# Patient Record
Sex: MALE | Race: BLACK OR AFRICAN AMERICAN | Employment: FULL TIME | URBAN - METROPOLITAN AREA
[De-identification: names, ages, dates, MRNs, and addresses within clinical notes are randomized per-mention and may not be internally consistent; named-entity substitution may affect disease eponyms.]

---

## 2024-03-26 ENCOUNTER — HOSPITAL ENCOUNTER (EMERGENCY)
Facility: CLINIC | Age: 24
Discharge: HOME | End: 2024-03-26
Attending: STUDENT IN AN ORGANIZED HEALTH CARE EDUCATION/TRAINING PROGRAM | Admitting: STUDENT IN AN ORGANIZED HEALTH CARE EDUCATION/TRAINING PROGRAM
Payer: COMMERCIAL

## 2024-03-26 VITALS
BODY MASS INDEX: 20.04 KG/M2 | OXYGEN SATURATION: 99 % | HEART RATE: 86 BPM | DIASTOLIC BLOOD PRESSURE: 81 MMHG | RESPIRATION RATE: 18 BRPM | TEMPERATURE: 97.3 F | WEIGHT: 140 LBS | SYSTOLIC BLOOD PRESSURE: 134 MMHG | HEIGHT: 70 IN

## 2024-03-26 DIAGNOSIS — R30.0 DYSURIA: ICD-10-CM

## 2024-03-26 DIAGNOSIS — R36.9 PENILE DISCHARGE: ICD-10-CM

## 2024-03-26 DIAGNOSIS — L30.1 DYSHIDROSIS (POMPHOLYX): ICD-10-CM

## 2024-03-26 LAB
ALBUMIN UR-MCNC: NEGATIVE MG/DL
APPEARANCE UR: CLEAR
BILIRUB UR QL STRIP: NEGATIVE
COLOR UR AUTO: ABNORMAL
GLUCOSE UR STRIP-MCNC: NEGATIVE MG/DL
HGB UR QL STRIP: NEGATIVE
KETONES UR STRIP-MCNC: NEGATIVE MG/DL
LEUKOCYTE ESTERASE UR QL STRIP: NEGATIVE
MUCOUS THREADS #/AREA URNS LPF: PRESENT /LPF
NITRATE UR QL: NEGATIVE
PH UR STRIP: 5.5 [PH] (ref 5–7)
RBC URINE: <1 /HPF
SP GR UR STRIP: 1.02 (ref 1–1.03)
UROBILINOGEN UR STRIP-MCNC: NORMAL MG/DL
WBC URINE: <1 /HPF

## 2024-03-26 PROCEDURE — 96372 THER/PROPH/DIAG INJ SC/IM: CPT | Performed by: STUDENT IN AN ORGANIZED HEALTH CARE EDUCATION/TRAINING PROGRAM

## 2024-03-26 PROCEDURE — 99284 EMERGENCY DEPT VISIT MOD MDM: CPT

## 2024-03-26 PROCEDURE — 250N000011 HC RX IP 250 OP 636: Performed by: STUDENT IN AN ORGANIZED HEALTH CARE EDUCATION/TRAINING PROGRAM

## 2024-03-26 PROCEDURE — 87491 CHLMYD TRACH DNA AMP PROBE: CPT | Performed by: STUDENT IN AN ORGANIZED HEALTH CARE EDUCATION/TRAINING PROGRAM

## 2024-03-26 PROCEDURE — 87591 N.GONORRHOEAE DNA AMP PROB: CPT | Performed by: STUDENT IN AN ORGANIZED HEALTH CARE EDUCATION/TRAINING PROGRAM

## 2024-03-26 PROCEDURE — 250N000009 HC RX 250: Performed by: STUDENT IN AN ORGANIZED HEALTH CARE EDUCATION/TRAINING PROGRAM

## 2024-03-26 PROCEDURE — 250N000013 HC RX MED GY IP 250 OP 250 PS 637: Performed by: STUDENT IN AN ORGANIZED HEALTH CARE EDUCATION/TRAINING PROGRAM

## 2024-03-26 PROCEDURE — 81001 URINALYSIS AUTO W/SCOPE: CPT | Performed by: EMERGENCY MEDICINE

## 2024-03-26 RX ORDER — DOXYCYCLINE 100 MG/1
100 CAPSULE ORAL 2 TIMES DAILY
Qty: 14 CAPSULE | Refills: 0 | Status: SHIPPED | OUTPATIENT
Start: 2024-03-26 | End: 2024-04-02

## 2024-03-26 RX ORDER — DOXYCYCLINE 100 MG/1
100 CAPSULE ORAL ONCE
Status: COMPLETED | OUTPATIENT
Start: 2024-03-26 | End: 2024-03-26

## 2024-03-26 RX ORDER — CLOBETASOL PROPIONATE 0.5 MG/G
CREAM TOPICAL 2 TIMES DAILY
Qty: 60 G | Refills: 0 | Status: SHIPPED | OUTPATIENT
Start: 2024-03-26 | End: 2024-04-09

## 2024-03-26 RX ADMIN — DOXYCYCLINE HYCLATE 100 MG: 100 CAPSULE, GELATIN COATED ORAL at 19:34

## 2024-03-26 RX ADMIN — LIDOCAINE HYDROCHLORIDE 500 MG: 10 INJECTION, SOLUTION EPIDURAL; INFILTRATION; INTRACAUDAL; PERINEURAL at 19:32

## 2024-03-26 ASSESSMENT — ACTIVITIES OF DAILY LIVING (ADL)
ADLS_ACUITY_SCORE: 35
ADLS_ACUITY_SCORE: 35

## 2024-03-26 ASSESSMENT — COLUMBIA-SUICIDE SEVERITY RATING SCALE - C-SSRS
2. HAVE YOU ACTUALLY HAD ANY THOUGHTS OF KILLING YOURSELF IN THE PAST MONTH?: NO
1. IN THE PAST MONTH, HAVE YOU WISHED YOU WERE DEAD OR WISHED YOU COULD GO TO SLEEP AND NOT WAKE UP?: NO
6. HAVE YOU EVER DONE ANYTHING, STARTED TO DO ANYTHING, OR PREPARED TO DO ANYTHING TO END YOUR LIFE?: NO

## 2024-03-26 NOTE — DISCHARGE INSTRUCTIONS
I have sent in antibiotic to be taken twice daily for the next week for treatment of presumed sexually-transmitted infection.  You will receive a call if your test returned positive for either of the infections tested.    I have also sent a cream for the rash that is consistent with dyshidrotic eczema.  Please apply this cream to rash twice daily for 2 weeks.    I have also provided a primary care referral for you to follow-up with in clinic for ongoing medical concerns.

## 2024-03-26 NOTE — ED PROVIDER NOTES
"  History     Chief Complaint:  Rash       HPI   Phil Chester is a 24 year old male who presents with pruritic rash to bilateral hands and feet along with dysuria.  Patient states rash has been present for roughly 3 weeks and he has noticed between webspaces of his digits on his hands along with the medial aspect of his left foot.  He describes it as a very itchy in nature, causing him to break skin due to scratching.  Rash is not noticed elsewhere.  He also reports dysuria that has been ongoing for about a month.  Also endorses penile discharge that is an off-white color.  He reports that his penis also feels \"itchy\".  He is concerned for possible UTI versus sexually transmitted infection.  No known exposure to STIs.  Denies any fever, body aches, penile ulcerations or wounds.      Independent Historian:   None - Patient Only    Review of External Notes:   None       Medications:    clobetasol propionate (TEMOVATE) 0.05 % external cream  doxycycline hyclate (VIBRAMYCIN) 100 MG capsule        Past Medical History:    No past medical history on file.    Past Surgical History:    No past surgical history on file.     Physical Exam   Patient Vitals for the past 24 hrs:   BP Temp Temp src Pulse Resp SpO2 Height Weight   03/26/24 1645 134/81 97.3  F (36.3  C) Temporal 86 18 99 % 1.778 m (5' 10\") 63.5 kg (140 lb)        Physical Exam  General: Alert and cooperative with exam. Patient in no apparent distress. Normal mentation.  Head:  Scalp is NC/AT  Eyes:  No scleral icterus, PERRL  ENT:  The external nose and ears are normal.   Neck:  Normal range of motion without rigidity.  CV:  Regular rate and rhythm    No pathologic murmur   Resp:  Breath sounds are clear bilaterally    Non-labored, no retractions or accessory muscle use  GI:  Abdomen is soft, no distension, no tenderness. No peritoneal signs  MS:  No lower extremity edema   Skin:  Pruritic vesicular eruptions to web spaces between digits on bilateral hands. " Similar appearances present to medial forefoot of left foot. No involvement between toes.   Neuro:  Oriented x 3. No gross motor deficits.      Emergency Department Course     Laboratory:  Labs Ordered and Resulted from Time of ED Arrival to Time of ED Departure   ROUTINE UA WITH MICROSCOPIC REFLEX TO CULTURE - Abnormal       Result Value    Color Urine Light Yellow      Appearance Urine Clear      Glucose Urine Negative      Bilirubin Urine Negative      Ketones Urine Negative      Specific Gravity Urine 1.023      Blood Urine Negative      pH Urine 5.5      Protein Albumin Urine Negative      Urobilinogen Urine Normal      Nitrite Urine Negative      Leukocyte Esterase Urine Negative      Mucus Urine Present (*)     RBC Urine <1      WBC Urine <1     CHLAMYDIA TRACHOMATIS PCR   NEISSERIA GONORRHOEAE PCR        Procedures   None    Emergency Department Course & Assessments:    Interventions:  Medications   cefTRIAXone (ROCEPHIN) 500 mg in lidocaine injection (has no administration in time range)   doxycycline hyclate (VIBRAMYCIN) capsule 100 mg (has no administration in time range)        Independent Interpretation (X-rays, CTs, rhythm strip):  None    Consultations/Discussion of Management or Tests:  None        Social Determinants of Health affecting care:   None    Disposition:  The patient was discharged.     Impression & Plan    CMS Diagnoses: None    MIPS (If applicable):  N/A    Medical Decision Making:  Phil Chester is a 24 year old male who presents with 2 different complaints, 1 complaint being dysuria, other being rash to bilateral hands and feet.  See HPI for further details.  On exam he does have evidence of pruritic vesicular eruptions to the web spaces between digits on bilateral hands along with involvement to the medial aspect of his left foot.  Rash is not erythematous in nature, not painful to touch.  No burrowing appearance to suggest scabies.  Rash is consistent with that of dyshidrotic  eczema.  Will send patient home with Rx for clobetasol cream to be applied twice daily for the next 2 weeks.  Unrelated, patient endorsed dysuria and penile discharge that has been present for roughly 1 month.  Denies any penile ulcerations or wounds, no fevers or bodyaches.  He is concerned for possible STI.  STI workup was completed and given that patient is having symptoms as such, prophylactic treatment started today.  Provided IM ceftriaxone and initial dose of doxycycline, Rx for remaining doxycycline course sent to pharmacy as well.  He does not appear to have primary care established, referral was provided today.  He will be called if the results of his STI testing returned positive.  UA was obtained and was negative for any evidence of urinary tract infection today.  He is vitally stable and afebrile.  He is safe for discharge home.      Diagnosis:    ICD-10-CM    1. Dyshidrosis (pompholyx)  L30.1 Primary Care Referral      2. Dysuria  R30.0 Primary Care Referral      3. Penile discharge  R36.9 Primary Care Referral           Discharge Medications:  New Prescriptions    CLOBETASOL PROPIONATE (TEMOVATE) 0.05 % EXTERNAL CREAM    Apply topically 2 times daily for 14 days    DOXYCYCLINE HYCLATE (VIBRAMYCIN) 100 MG CAPSULE    Take 1 capsule (100 mg) by mouth 2 times daily for 7 days          3/26/2024   Tamara Ragland PA-C Snell, Lauren R, PA-C  03/26/24 3413

## 2024-03-26 NOTE — ED TRIAGE NOTES
Rashes all over the body with itching for 2 weeks, burning with urination for 3 weeks.      Triage Assessment (Adult)       Row Name 03/26/24 5609          Triage Assessment    Airway WDL WDL        Respiratory WDL    Respiratory WDL WDL        Skin Circulation/Temperature WDL    Skin Circulation/Temperature WDL WDL        Cardiac WDL    Cardiac WDL WDL        Peripheral/Neurovascular WDL    Peripheral Neurovascular WDL WDL        Cognitive/Neuro/Behavioral WDL    Cognitive/Neuro/Behavioral WDL WDL

## 2024-03-27 LAB
C TRACH DNA SPEC QL NAA+PROBE: NEGATIVE
N GONORRHOEA DNA SPEC QL NAA+PROBE: NEGATIVE